# Patient Record
Sex: FEMALE | Race: WHITE | URBAN - METROPOLITAN AREA
[De-identification: names, ages, dates, MRNs, and addresses within clinical notes are randomized per-mention and may not be internally consistent; named-entity substitution may affect disease eponyms.]

---

## 2022-09-13 ENCOUNTER — TELEPHONE ENCOUNTER (OUTPATIENT)
Dept: URBAN - METROPOLITAN AREA CLINIC 92 | Facility: CLINIC | Age: 42
End: 2022-09-13

## 2022-10-28 ENCOUNTER — LAB OUTSIDE AN ENCOUNTER (OUTPATIENT)
Dept: URBAN - NONMETROPOLITAN AREA CLINIC 2 | Facility: CLINIC | Age: 42
End: 2022-10-28

## 2022-10-28 ENCOUNTER — WEB ENCOUNTER (OUTPATIENT)
Dept: URBAN - NONMETROPOLITAN AREA CLINIC 2 | Facility: CLINIC | Age: 42
End: 2022-10-28

## 2022-10-28 ENCOUNTER — OFFICE VISIT (OUTPATIENT)
Dept: URBAN - NONMETROPOLITAN AREA CLINIC 2 | Facility: CLINIC | Age: 42
End: 2022-10-28
Payer: COMMERCIAL

## 2022-10-28 VITALS
BODY MASS INDEX: 24.55 KG/M2 | DIASTOLIC BLOOD PRESSURE: 82 MMHG | SYSTOLIC BLOOD PRESSURE: 115 MMHG | WEIGHT: 162 LBS | TEMPERATURE: 97.8 F | HEIGHT: 68 IN | HEART RATE: 64 BPM

## 2022-10-28 DIAGNOSIS — R10.32 LLQ ABDOMINAL PAIN: ICD-10-CM

## 2022-10-28 DIAGNOSIS — R19.8 ALTERNATING CONSTIPATION AND DIARRHEA: ICD-10-CM

## 2022-10-28 DIAGNOSIS — R74.8 ELEVATED LIVER ENZYMES: ICD-10-CM

## 2022-10-28 LAB
A/G RATIO: 2.1
ALBUMIN: 4.8
ALKALINE PHOSPHATASE: 60
ALT (SGPT): 33
ANION GAP: 12
AST (SGOT): 24
BASOPHILS AUTOMATED ABSOLUTE COUNT: 0
BASOPHILS RELATIVE PERCENT: 0.9
BILIRUBIN TOTAL: 0.9
BLOOD UREA NITROGEN: 15
BUN / CREAT RATIO: 18
C-REACTIVE PROTEIN: 0.06
CALCIUM: 10.2
CHLORIDE: 104
CO2: 27
CREATININE, SERUM: 0.82
EGFR (CKD-EPI): >60
EOSINOPHILS AUTOMATED ABSOLUTE COUNT: 0
EOSINOPHILS RELATIVE PERCENT: 0.2
GLUCOSE: 104
HEMATOCRIT: 41.9
HEMOGLOBIN: 13.9
LYMPHOCYTES AUTOMATED ABSOLUTE COUNT: 1.7
LYMPHOCYTES RELATIVE PERCENT: 30.5
MEAN CORPUSCULAR HEMOGLOBIN CONC: 33.1
MEAN CORPUSCULAR HEMOGLOBIN: 30.9
MEAN CORPUSCULAR VOLUME: 93.4
MEAN PLATELET VOLUME: 8.4
MONOCYTES AUTOMATED ABSOLUTE COUNT: 0.2
MONOCYTES RELATIVE PERCENT: 3.1
NEUTROPHILS AUTOMATED ABSOLUTE: 3.6
NEUTROPHILS RELATIVE PERCENT: 65.3
PLATELET COUNT: 410
POTASSIUM: 4.2
PROTEIN TOTAL: 7.1
RED BLOOD CELL COUNT: 4.49
RED CELL DISTRIBUTION WIDTH: 13.5
SEDIMENTATION RATE, ERYTHROCYTE: 4
SODIUM: 139
TSH: 0.71
WHITE BLOOD CELL COUNT: 5.5

## 2022-10-28 PROCEDURE — 99204 OFFICE O/P NEW MOD 45 MIN: CPT | Performed by: NURSE PRACTITIONER

## 2022-10-28 RX ORDER — VALACYCLOVIR 1 G/1
TABLET, FILM COATED ORAL
Qty: 30 TABLET | Status: ACTIVE | COMMUNITY

## 2022-10-28 RX ORDER — SPIRONOLACTONE 50 MG/1
TABLET, FILM COATED ORAL
Qty: 90 TABLET | Status: ACTIVE | COMMUNITY

## 2022-10-28 RX ORDER — TRIAMCINOLONE ACETONIDE 1 MG/G
CREAM TOPICAL
Qty: 80 GRAM | Status: ACTIVE | COMMUNITY

## 2022-10-28 RX ORDER — SODIUM, POTASSIUM,MAG SULFATES 17.5-3.13G
354 ML AS DIRECTED SOLUTION, RECONSTITUTED, ORAL ORAL ONCE
Qty: 354 MILLILITER | Refills: 0 | OUTPATIENT
Start: 2022-10-28 | End: 2022-10-29

## 2022-10-28 NOTE — HPI-TODAY'S VISIT:
10/28/2022 Tyesha presents for evaluation of left lower quadrant abdominal pain and change in bowel habits.  Recently she has had 2 acute episodes of severe left lower quadrant abdominal pain.  Initially she thought this was due to an ovarian cyst.  In September she had a severe episode that lasted 2 days, she went to the emergency department and her liver enzymes were significantly elevated AST in the 200s, ALT in the 60s.  She had a contrasted CT scan that shows no acute pathology and normal liver, she does have some fluid in the pelvic sac.  Her bowels are regular with alternating constipation and diarrhea, constipation predominance.  She does have anorectal pain at times with likely hemorrhoid.  She is never had a colonoscopy.  She denies any rectal bleeding or weight loss.  Today she continues to struggle with bowel regularity.  She agrees to repeat labs and to schedule colonoscopy to rule out inflammation or diverticular disease.  In the meantime we will start fiber and MiraLAX daily.  Levsin as needed for spasm.  If this is normal and her symptoms persist consider a course of Xifaxan.  MB

## 2022-11-02 LAB
GAMMAGLOBULIN; IGA: 73
INTERP CELIAC DISEASE: (no result)
TTG AB IGA: <1

## 2022-12-09 ENCOUNTER — OFFICE VISIT (OUTPATIENT)
Dept: URBAN - NONMETROPOLITAN AREA CLINIC 2 | Facility: CLINIC | Age: 42
End: 2022-12-09

## 2023-01-19 ENCOUNTER — OFFICE VISIT (OUTPATIENT)
Dept: URBAN - METROPOLITAN AREA MEDICAL CENTER 1 | Facility: MEDICAL CENTER | Age: 43
End: 2023-01-19
Payer: COMMERCIAL

## 2023-01-19 DIAGNOSIS — K59.09 CHANGE IN BOWEL MOVEMENTS INTERMITTENT CONSTIPATION. URGENCY IN THE MORNING.: ICD-10-CM

## 2023-01-19 DIAGNOSIS — R10.32 ABDOMINAL CRAMPING IN LEFT LOWER QUADRANT: ICD-10-CM

## 2023-01-19 PROCEDURE — 45378 DIAGNOSTIC COLONOSCOPY: CPT | Performed by: INTERNAL MEDICINE

## 2023-03-24 ENCOUNTER — OFFICE VISIT (OUTPATIENT)
Dept: URBAN - NONMETROPOLITAN AREA CLINIC 2 | Facility: CLINIC | Age: 43
End: 2023-03-24
Payer: COMMERCIAL

## 2023-03-24 ENCOUNTER — DASHBOARD ENCOUNTERS (OUTPATIENT)
Age: 43
End: 2023-03-24

## 2023-03-24 VITALS
HEIGHT: 68 IN | SYSTOLIC BLOOD PRESSURE: 120 MMHG | HEART RATE: 71 BPM | DIASTOLIC BLOOD PRESSURE: 83 MMHG | BODY MASS INDEX: 23.25 KG/M2 | WEIGHT: 153.4 LBS

## 2023-03-24 DIAGNOSIS — R74.8 ELEVATED LIVER ENZYMES: ICD-10-CM

## 2023-03-24 DIAGNOSIS — R19.8 ALTERNATING CONSTIPATION AND DIARRHEA: ICD-10-CM

## 2023-03-24 DIAGNOSIS — R10.32 LLQ ABDOMINAL PAIN: ICD-10-CM

## 2023-03-24 PROBLEM — 249517009: Status: ACTIVE | Noted: 2022-10-28

## 2023-03-24 PROBLEM — 301716002: Status: ACTIVE | Noted: 2022-10-28

## 2023-03-24 PROBLEM — 707724006: Status: ACTIVE | Noted: 2022-10-28

## 2023-03-24 PROCEDURE — 99214 OFFICE O/P EST MOD 30 MIN: CPT | Performed by: NURSE PRACTITIONER

## 2023-03-24 RX ORDER — SPIRONOLACTONE 50 MG/1
TABLET, FILM COATED ORAL
Qty: 90 TABLET | Status: ACTIVE | COMMUNITY

## 2023-03-24 RX ORDER — HYOSCYAMINE SULFATE 0.12 MG/1
1 TABLET UNDER THE TONGUE AND ALLOW TO DISSOLVE AS NEEDED TABLET SUBLINGUAL THREE TIMES A DAY
Qty: 90 TABLET | Refills: 3 | OUTPATIENT
Start: 2023-03-24 | End: 2023-07-22

## 2023-03-24 RX ORDER — VALACYCLOVIR 1 G/1
TABLET, FILM COATED ORAL
Qty: 30 TABLET | Status: ACTIVE | COMMUNITY

## 2023-03-24 RX ORDER — TRIAMCINOLONE ACETONIDE 1 MG/G
CREAM TOPICAL
Qty: 80 GRAM | Status: ACTIVE | COMMUNITY

## 2023-03-24 NOTE — HPI-TODAY'S VISIT:
10/28/2022 Tyesha presents for evaluation of left lower quadrant abdominal pain and change in bowel habits.  Recently she has had 2 acute episodes of severe left lower quadrant abdominal pain.  Initially she thought this was due to an ovarian cyst.  In September she had a severe episode that lasted 2 days, she went to the emergency department and her liver enzymes were significantly elevated AST in the 200s, ALT in the 60s.  She had a contrasted CT scan that shows no acute pathology and normal liver, she does have some fluid in the pelvic sac.  Her bowels are regular with alternating constipation and diarrhea, constipation predominance.  She does have anorectal pain at times with likely hemorrhoid.  She is never had a colonoscopy.  She denies any rectal bleeding or weight loss.  Today she continues to struggle with bowel regularity.  She agrees to repeat labs and to schedule colonoscopy to rule out inflammation or diverticular disease.  In the meantime we will start fiber and MiraLAX daily.  Levsin as needed for spasm.  If this is normal and her symptoms persist consider a course of Xifaxan.  MB 3/24/2023 Noelle presents for follow-up of irritable bowel syndrome.  Her blood work is normal, her colonoscopy is normal.  Her bowels have normalized on MiraLAX and fiber.  Today she denies any new GI complaints.  I am can call her in Levsin to have needed as needed for spasm.  MB